# Patient Record
Sex: MALE | Race: WHITE
[De-identification: names, ages, dates, MRNs, and addresses within clinical notes are randomized per-mention and may not be internally consistent; named-entity substitution may affect disease eponyms.]

---

## 2019-08-18 ENCOUNTER — HOSPITAL ENCOUNTER (OUTPATIENT)
Dept: HOSPITAL 92 - ERS | Age: 54
Setting detail: OBSERVATION
LOS: 1 days | Discharge: HOME | End: 2019-08-19
Attending: INTERNAL MEDICINE | Admitting: INTERNAL MEDICINE
Payer: MEDICARE

## 2019-08-18 VITALS — BODY MASS INDEX: 18.6 KG/M2

## 2019-08-18 DIAGNOSIS — Z79.899: ICD-10-CM

## 2019-08-18 DIAGNOSIS — Z79.82: ICD-10-CM

## 2019-08-18 DIAGNOSIS — L30.4: Primary | ICD-10-CM

## 2019-08-18 DIAGNOSIS — I10: ICD-10-CM

## 2019-08-18 DIAGNOSIS — Z88.6: ICD-10-CM

## 2019-08-18 DIAGNOSIS — Z88.5: ICD-10-CM

## 2019-08-18 DIAGNOSIS — M08.00: ICD-10-CM

## 2019-08-18 DIAGNOSIS — Z88.8: ICD-10-CM

## 2019-08-18 LAB
ALBUMIN SERPL BCG-MCNC: 4.1 G/DL (ref 3.5–5)
ALP SERPL-CCNC: 110 U/L (ref 40–150)
ALT SERPL W P-5'-P-CCNC: 7 U/L (ref 8–55)
ANION GAP SERPL CALC-SCNC: 13 MMOL/L (ref 10–20)
AST SERPL-CCNC: 15 U/L (ref 5–34)
BACTERIA UR QL AUTO: (no result) HPF
BASOPHILS # BLD AUTO: 0.1 THOU/UL (ref 0–0.2)
BASOPHILS NFR BLD AUTO: 0.6 % (ref 0–1)
BILIRUB SERPL-MCNC: 0.3 MG/DL (ref 0.2–1.2)
BUN SERPL-MCNC: 8 MG/DL (ref 8.4–25.7)
CALCIUM SERPL-MCNC: 9.5 MG/DL (ref 7.8–10.44)
CHLORIDE SERPL-SCNC: 105 MMOL/L (ref 98–107)
CO2 SERPL-SCNC: 24 MMOL/L (ref 22–29)
CREAT CL PREDICTED SERPL C-G-VRATE: 0 ML/MIN (ref 70–130)
EOSINOPHIL # BLD AUTO: 0.6 THOU/UL (ref 0–0.7)
EOSINOPHIL NFR BLD AUTO: 6 % (ref 0–10)
GLOBULIN SER CALC-MCNC: 3.3 G/DL (ref 2.4–3.5)
GLUCOSE SERPL-MCNC: 92 MG/DL (ref 70–105)
HGB BLD-MCNC: 12.1 G/DL (ref 14–18)
LEUKOCYTE ESTERASE UR QL STRIP.AUTO: 75 LEU/UL
LYMPHOCYTES # BLD: 1.7 THOU/UL (ref 1.2–3.4)
LYMPHOCYTES NFR BLD AUTO: 16.8 % (ref 21–51)
MCH RBC QN AUTO: 33.2 PG (ref 27–31)
MCV RBC AUTO: 97.2 FL (ref 78–98)
MONOCYTES # BLD AUTO: 0.9 THOU/UL (ref 0.11–0.59)
MONOCYTES NFR BLD AUTO: 8.8 % (ref 0–10)
NEUTROPHILS # BLD AUTO: 6.8 THOU/UL (ref 1.4–6.5)
NEUTROPHILS NFR BLD AUTO: 67.8 % (ref 42–75)
PLATELET # BLD AUTO: 311 THOU/UL (ref 130–400)
POTASSIUM SERPL-SCNC: 3.8 MMOL/L (ref 3.5–5.1)
PROT UR STRIP.AUTO-MCNC: 20 MG/DL
RBC # BLD AUTO: 3.64 MILL/UL (ref 4.7–6.1)
SODIUM SERPL-SCNC: 138 MMOL/L (ref 136–145)
WBC # BLD AUTO: 10 THOU/UL (ref 4.8–10.8)
WBC UR QL AUTO: (no result) HPF (ref 0–3)

## 2019-08-18 PROCEDURE — 96367 TX/PROPH/DG ADDL SEQ IV INF: CPT

## 2019-08-18 PROCEDURE — 81015 MICROSCOPIC EXAM OF URINE: CPT

## 2019-08-18 PROCEDURE — G0378 HOSPITAL OBSERVATION PER HR: HCPCS

## 2019-08-18 PROCEDURE — 87040 BLOOD CULTURE FOR BACTERIA: CPT

## 2019-08-18 PROCEDURE — 72193 CT PELVIS W/DYE: CPT

## 2019-08-18 PROCEDURE — 85025 COMPLETE CBC W/AUTO DIFF WBC: CPT

## 2019-08-18 PROCEDURE — 80048 BASIC METABOLIC PNL TOTAL CA: CPT

## 2019-08-18 PROCEDURE — 80053 COMPREHEN METABOLIC PANEL: CPT

## 2019-08-18 PROCEDURE — 99284 EMERGENCY DEPT VISIT MOD MDM: CPT

## 2019-08-18 PROCEDURE — 97139 UNLISTED THERAPEUTIC PX: CPT

## 2019-08-18 PROCEDURE — 81003 URINALYSIS AUTO W/O SCOPE: CPT

## 2019-08-18 PROCEDURE — 96365 THER/PROPH/DIAG IV INF INIT: CPT

## 2019-08-18 PROCEDURE — 83605 ASSAY OF LACTIC ACID: CPT

## 2019-08-18 PROCEDURE — 36415 COLL VENOUS BLD VENIPUNCTURE: CPT

## 2019-08-18 NOTE — CT
CT pelvis with contrast:



DATE:

8/18/2019



HISTORY:

54-year-old male with erythema and drainage in the scrotum and buttock.



FINDINGS:

Bilateral femoral heads are small, flattened, and deformed. Bilateral protrusio acetabuli.

Low attenuation, probably representing hydroceles, in the scrotum bilaterally. Increased enhancement 
of skin and subcutaneous soft tissues of the perineum. Edema throughout the penis. Calcifications

of prostate gland. Urinary bladder is mildly distended but otherwise unremarkable. Mild circumferenti
al edema in the ischio rectal fossa, but no discrete intrapelvic fluid collection or free fluid.

Sigmoid colonic diverticulosis without convincing evidence of diverticulitis. No discrete abscess. No
 subcutaneous emphysema. Diffuse osteopenia. Gracile femoral shafts and iliac wings.



IMPRESSION:

1. Bilateral scrotal hydroceles.

2. Diffuse edema of scrotum and surrounding soft tissues.

3. No drainable abscess.

4. Chronic osseous changes suggestive of long-term paraplegia or quadriplegia.



Reported By: Diony Vance 

Electronically Signed:  8/18/2019 7:50 PM

## 2019-08-18 NOTE — HP
PRIMARY CARE PHYSICIAN:  Dr. Dominick Glasgow.



CHIEF COMPLAINT:  Severe itching and soreness in my scrotal area.



HISTORY OF PRESENT ILLNESS:  Mr. Howell is a pleasant 54-year-old gentleman, who has

fairly severe advanced rheumatoid arthritis.  He says that he began having problems

with his scrotal area on the 9th of this month.  He has severe arthritis due to

rheumatoid arthritis and has difficulty bending his neck or bending his torso and

cannot really see what is going on.  He has a provider, who comes in twice a day to

help him with some of his activities of daily living including dressing himself.

But he noticed that the itching got progressively worse.  He says he tried applying

some [employer] cream himself there, but it started to get sore and started to extend

on to the back of his legs.  He did feel a bit sticky and says that he was hot and

cold off and on, but no carlos fever.  No nausea.  No vomiting.  No abdominal pain,

but he did come into the hospital, where he was found to have quite a bit of redness

and excoriation in the scrotal area and is being admitted for scrotal cellulitis.

He denies any dysuria or frequency.  No nausea.  No vomiting.  No abdominal pain. 



REVIEW OF SYSTEMS:  CONSTITUTIONAL:  He did have some mild subjective fevers,

chills.  No night sweats.  No weight loss. 

HEENT:  No headaches.  No dizziness.  No visual changes.  No sore throat or

rhinorrhea. 

NECK:  No neck pain.  No adenopathy. 

PULMONARY:  No hemoptysis.  No cough.  No wheezing. 

CARDIOVASCULAR:  He denies any chest pain.  No shortness of breath.  No PND.  No

orthopnea. 

GASTROINTESTINAL:  No abdominal pain.  No nausea.  No vomiting.  No change in

bowels. 

GENITOURINARY:  He denies any urinary frequency.  No dysuria.  No discoloration of

the urine. 

MUSCULOSKELETAL:  He has severe limitation of his hands and legs as a result of the

severe rheumatoid arthritis as well as significant contracture deformities. 

SKIN AND INTEGUMENT:  No skin changes.  No rash.



PAST MEDICAL HISTORY:  Significant for hypertension and severe rheumatoid arthritis.



PAST SURGICAL HISTORY:  He has had surgery on his right hand as well as both knees,

but he says it was to remove, he says some buildup from the arthritis.  He also had

surgery for bladder stones. 



ALLERGIES:  VICODIN, WHICH CAUSES SEVERE NAUSEA, ASPIRIN AND DILANTIN, BUT HE DOES

NOT KNOW WHAT THESE DUE TO HIM. 



SOCIAL HISTORY:  He is single.  He lives alone.  He has no children.  His parents

would be his surrogate decision maker.  He has never thought about code status

recently, but says he may have a DNR, but he would like to be a full code now.  He

is a nonsmoker and nondrinker and he has a provider or caretaker, who comes in

daily. 



FAMILY HISTORY:  Significant for cancer and hypertension.



MEDICATIONS:  Include:

1. Tramadol.

2. Amlodipine.



PHYSICAL EXAMINATION:

GENERAL:  He is alert and oriented.  He appears to be in no acute distress.  He

appears to have dwarfism and has multiple contractures on both his upper and lower

extremities. 

VITAL SIGNS:  Blood pressure 118/72, heart rate 105, respiratory rate of 22, and

temperature is 100.3. 

HEENT:  Pupils are equal, round, and reactive.  Extraocular muscles are intact.  His

sclerae are anicteric.  Throat, no erythema, no exudates.  He has difficulty opening

his jaw beyond about 30 to 40 degrees.  I am unable to see the posterior pharynx.

He has poor dentition. 

NECK:  There is no adenopathy.  No bruits. 

LUNGS:  Clear to auscultation.  There was no wheezing.  No rales.  No rhonchi. 

CARDIOVASCULAR:  He has a normal S1 and S2.  I did not appreciate an S3 or S4.  No

murmurs, clicks, or rubs. 

ABDOMEN:  Soft.  It is nontender and nondistended.  Positive for bowel sounds.  No

rebound.  No guarding. 

GENITOURINARY:  I did have a chaperone, who is an RN here in the ER.  He was able to

be present during the entire exam and he had a complete view of the exam.  There was

increased redness in the perineal area as well as the scrotal area was beefy red and

there is some maceration and a mild odor, but there was no fluctuance.  No

induration.  No penile discharge.  The area was slightly raised, but there were no

open areas or ulcers. 

MUSCULOSKELETAL:  He has some muscle atrophy in both his upper and lower extremities

as well as contractures in the knees as well as he has severe deformities in both

hands. 

SKIN AND INTEGUMENT:  The skin changes in the scrotal area as previously mentioned.



LABORATORY RESULTS:  The white blood cell count is 10, hemoglobin 12.1, hematocrit

is 35.4, platelet count is 311.  Sodium 138, potassium 3.8, chloride is 105, CO2 is

24, BUN of 8, creatinine 0.62, and glucose is 92.  Urinalysis was essentially

negative. 



ASSESSMENT:  This is a pleasant 54-year-old gentleman, who presents with scrotal

itching and redness more than likely this represents a scrotal cellulitis.  It is

likely both bacterial as well as fungal as there are lots of maceration and moisture

in the area and with the patient's severe contractures of his hands.  I am

suspecting that he has extreme difficulty with general hygiene.  I asked him if his

provider helps him with hygiene in the groin area and he says that he does not and

he does this on his own, but just doing his hands in his general body habitus I find

this very difficult to believe that he is able to do this effectively.  Therefore,

he will be admitted and placed on both antibacterial as well as antifungal

medication.  We will get a wound care consult to see if what additional

recommendations there are and also do an assessment to see if he is capable of

performing these actions on his own. 

1. Hypertension.  We will continue his usual antihypertensive medications as well as

p.r.n. medicines for elevated blood pressure, and he will also be placed on deep

venous thrombosis as well as gastrointestinal prophylaxis. 







Job ID:  [id number]

## 2019-08-19 VITALS — DIASTOLIC BLOOD PRESSURE: 77 MMHG | SYSTOLIC BLOOD PRESSURE: 123 MMHG | TEMPERATURE: 98.8 F

## 2019-08-19 LAB
ANION GAP SERPL CALC-SCNC: 13 MMOL/L (ref 10–20)
BASOPHILS # BLD AUTO: 0.1 THOU/UL (ref 0–0.2)
BASOPHILS NFR BLD AUTO: 0.8 % (ref 0–1)
BUN SERPL-MCNC: 5 MG/DL (ref 8.4–25.7)
CALCIUM SERPL-MCNC: 8.4 MG/DL (ref 7.8–10.44)
CHLORIDE SERPL-SCNC: 110 MMOL/L (ref 98–107)
CO2 SERPL-SCNC: 23 MMOL/L (ref 22–29)
CREAT CL PREDICTED SERPL C-G-VRATE: 73 ML/MIN (ref 70–130)
EOSINOPHIL # BLD AUTO: 0.6 THOU/UL (ref 0–0.7)
EOSINOPHIL NFR BLD AUTO: 8.1 % (ref 0–10)
GLUCOSE SERPL-MCNC: 83 MG/DL (ref 70–105)
HGB BLD-MCNC: 11.1 G/DL (ref 14–18)
LYMPHOCYTES # BLD: 1.4 THOU/UL (ref 1.2–3.4)
LYMPHOCYTES NFR BLD AUTO: 18 % (ref 21–51)
MCH RBC QN AUTO: 33 PG (ref 27–31)
MCV RBC AUTO: 97.6 FL (ref 78–98)
MONOCYTES # BLD AUTO: 0.6 THOU/UL (ref 0.11–0.59)
MONOCYTES NFR BLD AUTO: 8.3 % (ref 0–10)
NEUTROPHILS # BLD AUTO: 4.9 THOU/UL (ref 1.4–6.5)
NEUTROPHILS NFR BLD AUTO: 64.8 % (ref 42–75)
PLATELET # BLD AUTO: 277 THOU/UL (ref 130–400)
POTASSIUM SERPL-SCNC: 3.6 MMOL/L (ref 3.5–5.1)
RBC # BLD AUTO: 3.37 MILL/UL (ref 4.7–6.1)
SODIUM SERPL-SCNC: 142 MMOL/L (ref 136–145)
WBC # BLD AUTO: 7.6 THOU/UL (ref 4.8–10.8)

## 2019-08-19 NOTE — PDOC.HOSPP
- Subjective


Encounter Date: 08/19/19


Encounter Time: 13:42


Subjective: 





 was seen today in follow-up of groin cellulitis. He says the itching 

is much better. No new complaints.





- Objective


Vital Signs & Weight: 


 Vital Signs (12 hours)











  Temp Pulse Resp BP BP Pulse Ox


 


 08/19/19 12:49  98.8 F  92  20   123/77  100


 


 08/19/19 08:36  98.2 F  94  16   100/65  100


 


 08/19/19 08:16  98.2 F  94  16  100/65   100


 


 08/19/19 08:00  98.2 F  94  16  100/65   100


 


 08/19/19 04:11  98.3 F  92  16   113/75  100








 Weight











Weight                         80 lb 6.4 oz














I&O: 


 











 08/18/19 08/19/19 08/20/19





 06:59 06:59 06:59


 


Intake Total  100 


 


Balance  100 











Result Diagrams: 


 08/19/19 05:57





 08/19/19 05:57





ROS





- Medication


Medications: 


Active Medications











Generic Name Dose Route Start Last Admin





  Trade Name Bharathi  PRN Reason Stop Dose Admin


 


Acetaminophen  650 mg  08/18/19 20:04  08/19/19 01:07





  Tylenol  PO   650 mg





  Q4H PRN   Administration





  Headache/Fever/Mild Pain (1-3)   





     





     





     


 


Clotrimazole  22.2 gm  08/18/19 21:00  08/18/19 21:48





  Clotrimazole 2% 3 Day Vag Cr  VAG   1 gr





  HS JOSE MARTIN   Administration





     





     





     





     


 


Enoxaparin Sodium  40 mg  08/19/19 09:00  08/19/19 08:47





  Lovenox  SC   Not Given





  0900 JOSE MARTIN   





     





     





     





     


 


Famotidine  20 mg  08/18/19 21:00  08/19/19 08:42





  Pepcid  PO   20 mg





  BID JOSE MARTIN   Administration





     





     





     





     


 


Fluconazole  100 mg  08/19/19 09:00  08/19/19 08:42





  Diflucan  PO   100 mg





  DAILY JOSE MARTIN   Administration





     





     





     





     


 


Ceftriaxone Sodium 1 gm/  100 mls @ 200 mls/hr  08/18/19 20:00  08/18/19 21:48





  Sodium Chloride  IVPB   100 mls





  Q24HR JOSE MARTIN   Administration





     





     





     





     














- Exam


Eye: PERRL, anicteric sclera


Heart: RRR, no murmur, no gallops, no rubs, normal peripheral pulses


Respiratory: CTAB, no wheezes, no rales, no ronchi, normal chest expansion


Gastrointestinal: soft, non-tender, non-distended, normal bowel sounds


Skin: normal turgor (+ erythema of the scrotum and medial thigh, less red than 

yesterday, and decreased masceration)





Hosp A/P


(1) Intertrigo


Code(s): L30.4 - ERYTHEMA INTERTRIGO   Status: Acute   





(2) HTN (hypertension)


Code(s): I10 - ESSENTIAL (PRIMARY) HYPERTENSION   Status: Chronic   





(3) JRA (juvenile rheumatoid arthritis)


Code(s): M08.00 - UNSP JUVENILE RHEUMATOID ARTHRITIS OF UNSPECIFIED SITE   

Status: Chronic   





- Plan





* Severe Intertrigo- continue Nystatin cream and local care


* JRA- he has been left with severe contracture of both upper and lower 

extremities


* He is stable for discharge- I have discussed the need for the patient to 

allow his provider to help in per-hygiene. I spoke with his caregiver over the 

phone- Aurora, and discussed instructions on how to apply the cream, and 

allowing some air to get to the area. 


* Stable for discharge home

## 2019-08-20 NOTE — DIS
DATE OF ADMISSION:  08/18/2019



DATE OF DISCHARGE:  08/19/2019



PRIMARY CARE PHYSICIAN:  Dr. Dominick Glasgow.



DISCHARGE DISPOSITION:  Home.



DISCHARGE DIAGNOSES:  

1. Severe intertrigo.

2. Juvenile rheumatoid arthritis.

3. Hypertension.



DISCHARGE MEDICATIONS:  Include:

1. Nystatin cream apply to affected area twice daily.

2. Tramadol 50 mg twice a day as needed.

3. Ibuprofen 200 mg twice daily.

4. Acetaminophen butalbital 1 tablet daily.

5. Amlodipine 5/160 one tablet daily.

6. Acetaminophen 500 mg as needed.



PROCEDURES DONE DURING THE ADMISSION:  The patient had a CT scan of the pelvis

showing some scrotal hydroceles.  There is some diffuse edema of the scrotum, but

there was no abscess. 



CODE STATUS:  Full code.



ALLERGIES:  TO ASPIRIN, CYCLOPHOSPHAMIDE, HYDROCODONE, AND PHENYTOIN.



HOSPITAL COURSE:  Mr. Howell is a pleasant 54-year-old gentleman, who presented to

the emergency room complaining of severe itching in the scrotal area.  He was found

to have a fungal infection in that area, likely due to excess moisture.  He was

treated with oral Diflucan, as well as nystatin cream with actual good improvement

overnight.  He was also treated with IV antibiotic as well.  However, I doubt that

there was very much bacterial superinfection.  Given his severe rheumatoid

arthritis, it is suspected that he has difficulty with hygiene in this area.  I

spoke with his provider over the phone, Aurora, who was very willing to assist in

hygiene in this area.  She has offered to do it in the past.  The patient seemed a

little shy about this, but he did agree to allow his caregiver to provide this

service.  The patient is therefore stable for discharge and to have close outpatient

followup. 







Job ID:  [id number]

## 2019-08-30 ENCOUNTER — HOSPITAL ENCOUNTER (EMERGENCY)
Dept: HOSPITAL 92 - ERS | Age: 54
Discharge: HOME | End: 2019-08-30
Payer: MEDICARE

## 2019-08-30 DIAGNOSIS — B35.6: Primary | ICD-10-CM

## 2019-08-30 DIAGNOSIS — I10: ICD-10-CM

## 2019-08-30 DIAGNOSIS — Z79.899: ICD-10-CM

## 2019-08-30 DIAGNOSIS — M06.9: ICD-10-CM

## 2019-08-30 PROCEDURE — 99282 EMERGENCY DEPT VISIT SF MDM: CPT
